# Patient Record
Sex: FEMALE | Race: WHITE
[De-identification: names, ages, dates, MRNs, and addresses within clinical notes are randomized per-mention and may not be internally consistent; named-entity substitution may affect disease eponyms.]

---

## 2018-01-04 ENCOUNTER — HOSPITAL ENCOUNTER (EMERGENCY)
Dept: HOSPITAL 17 - NEPE | Age: 73
LOS: 1 days | Discharge: HOME | End: 2018-01-05
Payer: COMMERCIAL

## 2018-01-04 VITALS
SYSTOLIC BLOOD PRESSURE: 152 MMHG | TEMPERATURE: 97.9 F | DIASTOLIC BLOOD PRESSURE: 65 MMHG | HEART RATE: 89 BPM | RESPIRATION RATE: 16 BRPM | OXYGEN SATURATION: 98 %

## 2018-01-04 VITALS — BODY MASS INDEX: 25.06 KG/M2 | WEIGHT: 165.35 LBS | HEIGHT: 68 IN

## 2018-01-04 VITALS
DIASTOLIC BLOOD PRESSURE: 56 MMHG | RESPIRATION RATE: 20 BRPM | OXYGEN SATURATION: 98 % | SYSTOLIC BLOOD PRESSURE: 102 MMHG | HEART RATE: 81 BPM

## 2018-01-04 VITALS
RESPIRATION RATE: 18 BRPM | DIASTOLIC BLOOD PRESSURE: 60 MMHG | OXYGEN SATURATION: 98 % | HEART RATE: 77 BPM | SYSTOLIC BLOOD PRESSURE: 131 MMHG

## 2018-01-04 DIAGNOSIS — Z88.8: ICD-10-CM

## 2018-01-04 DIAGNOSIS — R00.2: Primary | ICD-10-CM

## 2018-01-04 DIAGNOSIS — I48.0: ICD-10-CM

## 2018-01-04 LAB
BASOPHILS # BLD AUTO: 0 TH/MM3 (ref 0–0.2)
BASOPHILS NFR BLD: 0.5 % (ref 0–2)
BUN SERPL-MCNC: 25 MG/DL (ref 7–18)
CALCIUM SERPL-MCNC: 8.7 MG/DL (ref 8.5–10.1)
CHLORIDE SERPL-SCNC: 104 MEQ/L (ref 98–107)
CREAT SERPL-MCNC: 1.03 MG/DL (ref 0.5–1)
EOSINOPHIL # BLD: 0.1 TH/MM3 (ref 0–0.4)
EOSINOPHIL NFR BLD: 0.8 % (ref 0–4)
ERYTHROCYTE [DISTWIDTH] IN BLOOD BY AUTOMATED COUNT: 13.9 % (ref 11.6–17.2)
GFR SERPLBLD BASED ON 1.73 SQ M-ARVRAT: 53 ML/MIN (ref 89–?)
GLUCOSE SERPL-MCNC: 114 MG/DL (ref 74–106)
HCO3 BLD-SCNC: 26.4 MEQ/L (ref 21–32)
HCT VFR BLD CALC: 38.1 % (ref 35–46)
HGB BLD-MCNC: 12.9 GM/DL (ref 11.6–15.3)
LYMPHOCYTES # BLD AUTO: 1.5 TH/MM3 (ref 1–4.8)
LYMPHOCYTES NFR BLD AUTO: 18.8 % (ref 9–44)
MAGNESIUM SERPL-MCNC: 2.6 MG/DL (ref 1.5–2.5)
MCH RBC QN AUTO: 31.6 PG (ref 27–34)
MCHC RBC AUTO-ENTMCNC: 33.8 % (ref 32–36)
MCV RBC AUTO: 93.4 FL (ref 80–100)
MONOCYTE #: 0.8 TH/MM3 (ref 0–0.9)
MONOCYTES NFR BLD: 10.4 % (ref 0–8)
NEUTROPHILS # BLD AUTO: 5.5 TH/MM3 (ref 1.8–7.7)
NEUTROPHILS NFR BLD AUTO: 69.5 % (ref 16–70)
PLATELET # BLD: 232 TH/MM3 (ref 150–450)
PMV BLD AUTO: 7.4 FL (ref 7–11)
RBC # BLD AUTO: 4.08 MIL/MM3 (ref 4–5.3)
SODIUM SERPL-SCNC: 138 MEQ/L (ref 136–145)
TROPONIN I SERPL-MCNC: (no result) NG/ML (ref 0.02–0.05)
WBC # BLD AUTO: 7.9 TH/MM3 (ref 4–11)

## 2018-01-04 PROCEDURE — 84484 ASSAY OF TROPONIN QUANT: CPT

## 2018-01-04 PROCEDURE — 96360 HYDRATION IV INFUSION INIT: CPT

## 2018-01-04 PROCEDURE — 71045 X-RAY EXAM CHEST 1 VIEW: CPT

## 2018-01-04 PROCEDURE — 80048 BASIC METABOLIC PNL TOTAL CA: CPT

## 2018-01-04 PROCEDURE — 85025 COMPLETE CBC W/AUTO DIFF WBC: CPT

## 2018-01-04 PROCEDURE — 83735 ASSAY OF MAGNESIUM: CPT

## 2018-01-04 PROCEDURE — 99285 EMERGENCY DEPT VISIT HI MDM: CPT

## 2018-01-04 PROCEDURE — 93005 ELECTROCARDIOGRAM TRACING: CPT

## 2018-01-04 PROCEDURE — 82552 ASSAY OF CPK IN BLOOD: CPT

## 2018-01-04 PROCEDURE — 82550 ASSAY OF CK (CPK): CPT

## 2018-01-04 NOTE — RADRPT
EXAM DATE/TIME:  01/04/2018 22:14 

 

[facility] COMPARISON:     

No previous studies available for comparison.

 

                     

INDICATIONS :     

Palpitations

                     

 

MEDICAL HISTORY :     

None.          

 

SURGICAL HISTORY :     

None.   

 

ENCOUNTER:     

Initial                                        

 

ACUITY:     

1 day      

 

PAIN SCORE:     

0/10

 

LOCATION:      

chest 

 

FINDINGS:     

A single view of the chest demonstrates the lungs to be symmetrically aerated without evidence of mas
s, infiltrate or effusion.  The cardiomediastinal contours are unremarkable.  Osseous structures are 
intact.

 

CONCLUSION:     No acute disease.  

 

 

 

 Murali Hamilton MD on January 04, 2018 at 22:25           

Board Certified Radiologist.

 This report was verified electronically.

## 2018-01-04 NOTE — PD
HPI


Chief Complaint:  Cardiac Complaint


Time Seen by Provider:  22:10


Travel History


International Travel<30 days:  No


Contact w/Intl Traveler<30days:  No


Traveled to known affect area:  No





History of Present Illness


HPI


This is a 72-year-old female who reports a history of paroxysmal atrial 

fibrillation, cardiologist is Dr. Bustamante.  She presents for evaluation of 

palpitations.  She reports that she's.  His palpitations somewhat on a regular 

basis prior to arrival she experienced more intense palpitations and usual.  

She reports that she was lying down when this occurred.  It lasted one hour and 

then is currently resolved.  Denies any associated symptoms such as shortness 

of breath, chest pain, nausea, vomiting, dizziness, lightheadedness, abdominal 

pain.  Denies any recent illness.  She has no other complaints at this time.





PFS


Past Medical History


Atrial Fibrillation:  Yes


Heart Rhythm Problems:  Yes


Diabetes:  No


Diminished Hearing:  No


Tetanus Vaccination:  < 5 Years


Influenza Vaccination:  Yes


Menopausal:  No





Past Surgical History


Appendectomy:  Yes (2004)


Cholecystectomy:  Yes (2004)


Hysterectomy:  Yes (MANY YEARS AGO)





Social History


Alcohol Use:  No


Tobacco Use:  No


Substance Use:  No





Allergies-Medications


(Allergen,Severity, Reaction):  


Coded Allergies:  


     albuterol (Unverified  Allergy, Intermediate, 1/4/18)


     butorphanol (Unverified  Allergy, Intermediate, 1/4/18)


Reported Meds & Prescriptions





Reported Meds & Active Scripts


Active


Reported


No Current Meds (Miscellaneous Medication)  Misc    








Review of Systems


Except as stated in HPI:  all other systems reviewed are Neg





Physical Exam


Narrative


GENERAL: Well-developed well-nourished female in no acute distress


SKIN: Warm and dry.


HEAD: Atraumatic. Normocephalic. 


EYES: Pupils equal and round. No scleral icterus. No injection or drainage. 


ENT: No nasal bleeding or discharge.  Mucous membranes pink and moist.


NECK: Trachea midline. No JVD. 


CARDIOVASCULAR: Regular rate and rhythm.  No murmur appreciated.


RESPIRATORY: No accessory muscle use. Clear to auscultation. Breath sounds 

equal bilaterally. 


GASTROINTESTINAL: Abdomen soft, non-tender, nondistended. Hepatic and splenic 

margins not palpable. 


MUSCULOSKELETAL: No obvious deformities. No clubbing.  No cyanosis.  No edema. 


NEUROLOGICAL: Awake and alert. No obvious cranial nerve deficits.  Motor 

grossly within normal limits. Normal speech.


PSYCHIATRIC: Appropriate mood and affect; insight and judgment normal.





Data


Data


Last Documented VS





Vital Signs








  Date Time  Temp Pulse Resp B/P (MAP) Pulse Ox O2 Delivery O2 Flow Rate FiO2


 


1/5/18 02:25        


 


1/5/18 02:13  73 17   Room Air  


 


1/4/18 23:34     98   


 


1/4/18 21:00 97.9       








Orders





 Orders


Electrocardiogram (1/4/18 22:10)


Basic Metabolic Panel (Bmp) (1/4/18 22:10)


Ckmb (Isoenzyme) Profile (1/4/18 22:10)


Complete Blood Count With Diff (1/4/18 22:10)


Magnesium (Mg) (1/4/18 22:10)


Troponin I (1/4/18 22:10)


Chest, Single Ap (1/4/18 22:10)


Ecg Monitoring (1/4/18 22:10)


Iv Access Insert/Monitor (1/4/18 22:10)


Oximetry (1/4/18 22:10)


Sodium Chlorid 0.9% 500 Ml Inj (Ns 500 M (1/4/18 22:15)


CKMB (1/4/18 22:15)


CKMB% (1/4/18 22:15)


Troponin I (1/5/18 01:03)


Ed Discharge Order (1/5/18 02:08)


Ed Discharge Order (1/5/18 02:08)


Electrocardiogram (1/5/18 02:05)





Labs





Laboratory Tests








Test


  1/4/18


22:15 1/5/18


01:00


 


White Blood Count 7.9 TH/MM3  


 


Red Blood Count 4.08 MIL/MM3  


 


Hemoglobin 12.9 GM/DL  


 


Hematocrit 38.1 %  


 


Mean Corpuscular Volume 93.4 FL  


 


Mean Corpuscular Hemoglobin 31.6 PG  


 


Mean Corpuscular Hemoglobin


Concent 33.8 % 


  


 


 


Red Cell Distribution Width 13.9 %  


 


Platelet Count 232 TH/MM3  


 


Mean Platelet Volume 7.4 FL  


 


Neutrophils (%) (Auto) 69.5 %  


 


Lymphocytes (%) (Auto) 18.8 %  


 


Monocytes (%) (Auto) 10.4 %  


 


Eosinophils (%) (Auto) 0.8 %  


 


Basophils (%) (Auto) 0.5 %  


 


Neutrophils # (Auto) 5.5 TH/MM3  


 


Lymphocytes # (Auto) 1.5 TH/MM3  


 


Monocytes # (Auto) 0.8 TH/MM3  


 


Eosinophils # (Auto) 0.1 TH/MM3  


 


Basophils # (Auto) 0.0 TH/MM3  


 


CBC Comment DIFF FINAL  


 


Differential Comment   


 


Blood Urea Nitrogen 25 MG/DL  


 


Creatinine 1.03 MG/DL  


 


Random Glucose 114 MG/DL  


 


Calcium Level 8.7 MG/DL  


 


Magnesium Level 2.6 MG/DL  


 


Sodium Level 138 MEQ/L  


 


Potassium Level 4.3 MEQ/L  


 


Chloride Level 104 MEQ/L  


 


Carbon Dioxide Level 26.4 MEQ/L  


 


Anion Gap 8 MEQ/L  


 


Estimat Glomerular Filtration


Rate 53 ML/MIN 


  


 


 


Total Creatine Kinase 201 U/L  


 


Creatine Kinase MB 1.9 NG/ML  


 


Creatine Kinase MB % 0.9 %  


 


Troponin I


  LESS THAN 0.02


NG/ML LESS THAN 0.02


NG/ML











MDM


Medical Decision Making


Medical Screen Exam Complete:  Yes


Emergency Medical Condition:  Yes


Medical Record Reviewed:  Yes


Differential Diagnosis


Atrial fibrillation, PVCs, PACs, electrolyte abnormality, muscle spasm, sinus 

tachycardia, atrial flutter, ventricular tachycardia, heart block


Narrative Course


The patient was placed on ECG monitoring pulse oximetry.  Twelve-lead EKG was 

obtained revealing sinus rhythm with frequent supraventricular premature 

complexes.  Plan is for recent lab work, chest x-ray.  She was given 500 mL 

normal saline bolus.











Siddhartha Naranjo Jan 4, 2018 22:12

## 2018-01-05 VITALS — RESPIRATION RATE: 17 BRPM | DIASTOLIC BLOOD PRESSURE: 57 MMHG | SYSTOLIC BLOOD PRESSURE: 121 MMHG | HEART RATE: 73 BPM

## 2018-01-05 NOTE — PD
Physical Exam


Date Seen by Provider:  Jan 5, 2018


Time Seen by Provider:  00:00


Narrative


Patient's repeat troponin is negative repeat EKG is done she has not been any 

rapid A. fib the entire time on the monitor for 4 hours in the ER she is safe 

for discharge





Data


Data


Last Documented VS





Vital Signs








  Date Time  Temp Pulse Resp B/P (MAP) Pulse Ox O2 Delivery O2 Flow Rate FiO2


 


1/5/18 02:25        


 


1/5/18 02:13  73 17   Room Air  


 


1/4/18 23:34     98   


 


1/4/18 21:00 97.9       








Orders





 Orders


Electrocardiogram (1/4/18 22:10)


Basic Metabolic Panel (Bmp) (1/4/18 22:10)


Ckmb (Isoenzyme) Profile (1/4/18 22:10)


Complete Blood Count With Diff (1/4/18 22:10)


Magnesium (Mg) (1/4/18 22:10)


Troponin I (1/4/18 22:10)


Chest, Single Ap (1/4/18 22:10)


Ecg Monitoring (1/4/18 22:10)


Iv Access Insert/Monitor (1/4/18 22:10)


Oximetry (1/4/18 22:10)


Sodium Chlorid 0.9% 500 Ml Inj (Ns 500 M (1/4/18 22:15)


CKMB (1/4/18 22:15)


CKMB% (1/4/18 22:15)


Troponin I (1/5/18 01:03)


Ed Discharge Order (1/5/18 02:08)


Ed Discharge Order (1/5/18 02:08)


Electrocardiogram (1/5/18 02:05)





Labs





Laboratory Tests








Test


  1/4/18


22:15 1/5/18


01:00


 


White Blood Count 7.9 TH/MM3  


 


Red Blood Count 4.08 MIL/MM3  


 


Hemoglobin 12.9 GM/DL  


 


Hematocrit 38.1 %  


 


Mean Corpuscular Volume 93.4 FL  


 


Mean Corpuscular Hemoglobin 31.6 PG  


 


Mean Corpuscular Hemoglobin


Concent 33.8 % 


  


 


 


Red Cell Distribution Width 13.9 %  


 


Platelet Count 232 TH/MM3  


 


Mean Platelet Volume 7.4 FL  


 


Neutrophils (%) (Auto) 69.5 %  


 


Lymphocytes (%) (Auto) 18.8 %  


 


Monocytes (%) (Auto) 10.4 %  


 


Eosinophils (%) (Auto) 0.8 %  


 


Basophils (%) (Auto) 0.5 %  


 


Neutrophils # (Auto) 5.5 TH/MM3  


 


Lymphocytes # (Auto) 1.5 TH/MM3  


 


Monocytes # (Auto) 0.8 TH/MM3  


 


Eosinophils # (Auto) 0.1 TH/MM3  


 


Basophils # (Auto) 0.0 TH/MM3  


 


CBC Comment DIFF FINAL  


 


Differential Comment   


 


Blood Urea Nitrogen 25 MG/DL  


 


Creatinine 1.03 MG/DL  


 


Random Glucose 114 MG/DL  


 


Calcium Level 8.7 MG/DL  


 


Magnesium Level 2.6 MG/DL  


 


Sodium Level 138 MEQ/L  


 


Potassium Level 4.3 MEQ/L  


 


Chloride Level 104 MEQ/L  


 


Carbon Dioxide Level 26.4 MEQ/L  


 


Anion Gap 8 MEQ/L  


 


Estimat Glomerular Filtration


Rate 53 ML/MIN 


  


 


 


Total Creatine Kinase 201 U/L  


 


Creatine Kinase MB 1.9 NG/ML  


 


Creatine Kinase MB % 0.9 %  


 


Troponin I


  LESS THAN 0.02


NG/ML LESS THAN 0.02


NG/ML











MDM


Medical Record Reviewed:  Yes


Supervised Visit with ANJANA:  Yes


Diagnosis





 Primary Impression:  


 Palpitations


Patient Instructions:  General Instructions, Heart Palpitations (ED)


Disposition:  01 DISCHARGE HOME


Condition:  Santo Reyna MD Jan 5, 2018 02:08

## 2018-01-05 NOTE — EKG
Date Performed: 01/05/2018       Time Performed: 02:05:32

 

PTAGE:      72 years

 

EKG:      Sinus rhythm 

 

 LOW QRS VOLTAGE IN PRECORDIAL LEADS SEPTAL MYOCARDIAL INFARCTION ABNORMAL ECG

 

PREVIOUS TRACING       : 01/04/2018 22.06 Compared to prior tracing no significant change

 

DOCTOR:   Fabrice Elmore  Interpretating Date/Time  01/05/2018 21:58:26

## 2018-01-05 NOTE — EKG
Date Performed: 01/04/2018       Time Performed: 22:06:23

 

PTAGE:      72 years

 

EKG:      Sinus rhythm 

 

 WITH FREQUENT SUPRAVENTRICULAR PREMATURE COMPLEXES LOW QRS VOLTAGE IN PRECORDIAL LEADS SEPTAL MYOCAR
DIAL INFARCTION ABNORMAL ECG

 

PREVIOUS TRACING       : 04/01/2002 11.04 Compared to prior tracing no significant change

 

DOCTOR:   Fabrice Elmore  Interpretating Date/Time  01/05/2018 22:16:25